# Patient Record
(demographics unavailable — no encounter records)

---

## 2025-03-18 NOTE — LETTER BODY
[Dear  ___] : Dear  [unfilled], [Courtesy Letter:] : I had the pleasure of seeing your patient, [unfilled], in my office today. [Please see my note below.] : Please see my note below. [Consult Closing:] : Thank you very much for allowing me to participate in the care of this patient.  If you have any questions, please do not hesitate to contact me. [Sincerely,] : Sincerely, [FreeTextEntry3] : Genevieve Elizabeth MD Director of Robotic Education [facility]   [email or web address] [phone]

## 2025-03-18 NOTE — HISTORY OF PRESENT ILLNESS
[FreeTextEntry1] : Name CLIF LEDEZMA  MRN [id number]   1993  Contact Number: [phone] ----------------------------------------------------------------------------------------------------------------------------------------- Date of First Visit: 3/18/25 Referring Provider/PCP: Dr. Charles Mcgee f: [phone] -----------------------------------------------------------------------------------------------------------------------------------------  CC: recurrent UTI  History of Present Illness: CLIF LEDEZMA is a 32 year old female who presents for evaluation recurrent UTI. Patient reports had kidney infection 2016 in college related to intercourse - fever, flank pain and was given antibiotics (not admitted). Then got about 1 UTI a year - culture proven. 2024 - blood in urine, pain - UA nitrite positive and LE positive, no culture sent before abx- did telehealth and was given macrobid. Symptoms resolved. Then 2025 - another UTI (hematuria, dysuria) - + e. coli - was prescribed macrobid. Symptoms resolved. 2025 symptoms of frequency, dysuria after was traveling to [facility] MD UA- dip LE + blood + - prescribed cefdinir but culture was negative and stopped abx. Baseline back pain on left - dull pain.   Current symptoms: back to baseline - no frequency or urgency, no dysuria, feels like fully empties   # UTIs/year: 2 UTIs in the past 6 months - 1 culture proven Symptoms include: dysuria, hematuria, frequency MDR organism: no MDR organisms but some R to Bactrim and amp/sulbactam Post-coital: + Spermicide use - Febrile UTIs - once in 2016 Stones - none Hematuria - with episodes, resolves with treatment Prior surgery - none  Prior treatments: antibiotics with episodes, d-mannose, probiotics  PVR (to ensure adequate emptying): 0  Renal US today: wnl   PMH: HLD PSH: none Family History: no  malignancies, DM Social: in a relationship, no children, , never smoker, social alcohol, no recreational drugs Meds: d-mannose, probiotics, vit D Allergies: NKDA ROS: no fevers, chills, flank pain

## 2025-03-18 NOTE — ASSESSMENT
[FreeTextEntry1] : Recurrent UTI is defined as two episodes of acute bacterial cystitis within six months or three episodes within one year. Patient seems to meet criteria (in 6 month period 1 one positive culture and one UA positive and characteristic but no culture before abx). We discussed the natural history of UTIs and strategies to prevent incidence of UTIs. We discussed the data related to increase water intake and cranberry extract daily. We discussed antibiotic prophylaxis both continuous and post-coital. Discussed role of hiprex. Also discussed role of work-up and hematuria with UTI. Renal US today wnl. Would like to proceed with cysto. Discussed hematuria with episode - will ensure no micro present - but proceeding with work-up regardless.  Plan: - UA culture - cysto - cranberry, probiotics, hygiene - start hiprex + Vit C bid x 6 months - fu for cysto